# Patient Record
Sex: FEMALE | Race: WHITE
[De-identification: names, ages, dates, MRNs, and addresses within clinical notes are randomized per-mention and may not be internally consistent; named-entity substitution may affect disease eponyms.]

---

## 2022-02-10 ENCOUNTER — HOSPITAL ENCOUNTER (OUTPATIENT)
Dept: HOSPITAL 75 - CARD | Age: 32
End: 2022-02-10
Attending: NURSE PRACTITIONER
Payer: COMMERCIAL

## 2022-02-10 DIAGNOSIS — R00.2: Primary | ICD-10-CM

## 2022-02-10 PROCEDURE — 93225 XTRNL ECG REC<48 HRS REC: CPT

## 2022-02-10 PROCEDURE — 93226 XTRNL ECG REC<48 HR SCAN A/R: CPT

## 2022-04-14 ENCOUNTER — HOSPITAL ENCOUNTER (OUTPATIENT)
Dept: HOSPITAL 75 - RAD | Age: 32
End: 2022-04-14
Attending: NURSE PRACTITIONER
Payer: COMMERCIAL

## 2022-04-14 DIAGNOSIS — R19.7: ICD-10-CM

## 2022-04-14 DIAGNOSIS — K21.9: ICD-10-CM

## 2022-04-14 DIAGNOSIS — Z3A.01: ICD-10-CM

## 2022-04-14 DIAGNOSIS — O26.891: Primary | ICD-10-CM

## 2022-04-14 PROCEDURE — 76705 ECHO EXAM OF ABDOMEN: CPT

## 2022-04-14 NOTE — DIAGNOSTIC IMAGING REPORT
PROCEDURE: US Gallbladder.



TECHNIQUE: Multiple real-time grayscale images were obtained over

the right upper quadrant in various projections.



INDICATION: Epigastric pain. Chronic diarrhea. Gastroesophageal

reflux.



FINDINGS: Liver parenchyma appears normal. Liver measures 13 cm.

Gallbladder shows no gallstones or wall thickening. The common

duct measures 4 mm. Pancreas is poorly visualized due to bowel

gas. The aorta, vena cava and portal vein appear normal with

Doppler sampling. Right kidney measures 10.4 x 5.8 x 5.9 cm. No

ascites. Negative Sr sign.



IMPRESSION: Normal right upper quadrant ultrasound.



Dictated by: 



  Dictated on workstation # [id number]

## 2023-01-13 ENCOUNTER — HOSPITAL ENCOUNTER (OUTPATIENT)
Dept: HOSPITAL 75 - CARD | Age: 33
End: 2023-01-13
Attending: NURSE PRACTITIONER
Payer: COMMERCIAL

## 2023-01-13 DIAGNOSIS — R11.0: ICD-10-CM

## 2023-01-13 DIAGNOSIS — R19.7: Primary | ICD-10-CM

## 2023-01-13 PROCEDURE — 78227 HEPATOBIL SYST IMAGE W/DRUG: CPT

## 2023-01-13 NOTE — DIAGNOSTIC IMAGING REPORT
INDICATION: Diarrhea and nausea.



TECHNIQUE: Patient was administered 5.3 mCi technetium-99m

Choletec intravenously, and imaging over the abdomen was

performed. At 45 minutes, patient ingested 8 ounces of Ensure,

and the gallbladder ejection fraction was calculated.



FINDINGS: There is homogeneous uptake of activity by the liver

with prompt excretion of activity into the gallbladder and common

duct. There is normal passage of activity into the small bowel.

Gallbladder ejection fraction is normal at 36%.



IMPRESSION: Normal HIDA scan and gallbladder ejection fraction.



Dictated by: 



  Dictated on workstation # [id number]